# Patient Record
(demographics unavailable — no encounter records)

---

## 2024-10-17 NOTE — PHYSICAL EXAM
[] : no respiratory distress [Respiration, Rhythm And Depth] : normal respiratory rhythm and effort [Auscultation Breath Sounds / Voice Sounds] : lungs were clear to auscultation bilaterally [Apical Impulse] : the apical impulse was normal [Heart Rate And Rhythm] : heart rate was normal and rhythm regular [Heart Sounds] : normal S1 and S2 [Murmurs] : no murmurs [Clean] : clean [Dry] : dry [Healing Well] : healing well [No Edema] : no edema [FreeTextEntry3] : RT groin  [FreeTextEntry5] : LT G site

## 2024-10-17 NOTE — REASON FOR VISIT
[Family Member] : family member [de-identified] : re-do sternotomy and C3L (LIMA-LAD, SVG-OM, SVG-RCA) [de-identified] : 8/16/24

## 2024-10-17 NOTE — REASON FOR VISIT
[Family Member] : family member [de-identified] : re-do sternotomy and C3L (LIMA-LAD, SVG-OM, SVG-RCA) [de-identified] : 8/16/24

## 2024-10-17 NOTE — CONSULT LETTER
[Dear  ___] : Dear  [unfilled], [Courtesy Letter:] : I had the pleasure of seeing your patient, [unfilled], in my office today. [Please see my note below.] : Please see my note below. [Consult Closing:] : Thank you very much for allowing me to participate in the care of this patient.  If you have any questions, please do not hesitate to contact me. [FreeTextEntry2] : Dr. Mcguire [FreeTextEntry3] : Dr. Markel Arzate

## 2024-10-17 NOTE — PHYSICAL EXAM
[] : no respiratory distress [Respiration, Rhythm And Depth] : normal respiratory rhythm and effort [Auscultation Breath Sounds / Voice Sounds] : lungs were clear to auscultation bilaterally [Apical Impulse] : the apical impulse was normal [Heart Rate And Rhythm] : heart rate was normal and rhythm regular [Murmurs] : no murmurs [Heart Sounds] : normal S1 and S2 [Clean] : clean [Dry] : dry [Healing Well] : healing well [No Edema] : no edema [FreeTextEntry3] : RT groin  [FreeTextEntry5] : LT G site

## 2024-10-17 NOTE — ASSESSMENT
[FreeTextEntry1] : 80 y/o male with PMHx Mechanical Aortic valve replacement with Geoff Lester at [facility] 2004 for which he takes coumadin (last dose 8/9/24), SVT during a stress test (on metoprolol recently), Afib, DMT2 (pt follows Dr Lynn Nguyễn PMD), CKD-III (follows Renal Dr Kimani Christina) presents with SOB. Pt reports dyspnea on ambulation of 2-3 city blocks. Pt underwent NST which was Abnormal. Pt was referred for Cardiac cath by Dr Naren Mcguire. Pt s/p LHC demonstrating multivessel CAD and CT Surgery consulted for CABG evaluation.  8/16 s/p re-do sternotomy and C3L (LIMA-LAD, SVG-OM, SVG-RCA).  Post op Course: 2 PLTS/CRYO/ NSVT ARREST INOTROPES, Vascular consulted. LT SFA OCCLUSION  RT DISTAL TIBIAL ARTERY OCC, 8/18 VIFB ARREST / s/p defibrillated (required 1 shock with ACLS with ROSC after 1 round) subsequently reintubated. EP CONSULTED. Remains CHB/Junctional 50s with periods of AV conduction -- EP following, 8/20 Failed dysphagia eval -> s/p FEES passed.  8/23 s/p BiV ICD with EP.  Heart Failure Team following.  Started on Entresto 24/26 mg PO BID, INR 2.45 resumed home dose of coumadin 5 mg PO nightly. Acute Rehab facility bed available at [facility]. DC to  rehab. Follows Dr. Mcguire for cards/INR, follows Dr. Bailey for PCP  This visit he complaints of shortness of breath , His INR was 4.19 on 10/14/24. Denies any chest pain, palpitation, dizziness or pedal edema.   Dr. Arzate advised to take Warfarin 2.5 mg alternating with 5 mg very other day till he sees kaitlynn on 10/24/24.  Today on exam bilateral lung fields are clear, no wheezing or rales, no use of accessory muscles noted or respiratory distress, normal sinus rhythm, sternum stable, incision clean, dry and intact. RT groin and LT  SVG site is clean, dry and intact.  No peripheral edema noted.     Instructed patient on importance of optimal glycemic control, daily showering, daily weights, any signs of fever (temperature greater than 101F, chills,  incentive spirometer use, and increase ambulation as tolerated. Instructed to call office with any signs or symptoms of infection or weight gain of 2 or more pounds in 1 day or 3 or more pounds in 1 week.    Discussed intake of plant based foods, including vegetables, fruits, and whole grain foods: legumes, nuts and seeds, fish or seafood, lean meats, and non-fat or low-fat diary foods. Plant based oils (non-tropical) in place of solid fats. Instructed patient to limit intake of high fat meats and processed meats, high-fat diary foods, dietary cholesterol and sodium, foods and beverages with added sugars.   Plan: 1) Continue current medication regimen 2) Follow up with cardiologist ( Dr. Mcguire ) and PCP 3) Follow up in August 2025  4) Follow up with EP ( Dr. Sarkar)  5) Follow up INR with Dr. Mcguire and adjust the dose  6) Continue to increase activity and walk daily as tolerated. Continue to use incentive spirometer. 7) Keep legs elevated above heart when resting/sitting/sleeping. 8) Call MD if you experience fever, fatigue, dizziness, confusion, syncope, shortness of breath, chest pain not relieved with analgesics, increased redness/drainage from the surgical  incision site 9) Discontinue Entresto

## 2024-10-17 NOTE — ASSESSMENT
[FreeTextEntry1] : 80 y/o male with PMHx Mechanical Aortic valve replacement with Geoff Lester at [facility] 2004 for which he takes coumadin (last dose 8/9/24), SVT during a stress test (on metoprolol recently), Afib, DMT2 (pt follows Dr Lynn Nguyễn PMD), CKD-III (follows Renal Dr Kimani Christina) presents with SOB. Pt reports dyspnea on ambulation of 2-3 city blocks. Pt underwent NST which was Abnormal. Pt was referred for Cardiac cath by Dr Naren Mcguire. Pt s/p LHC demonstrating multivessel CAD and CT Surgery consulted for CABG evaluation.  8/16 s/p re-do sternotomy and C3L (LIMA-LAD, SVG-OM, SVG-RCA).  Post op Course: 2 PLTS/CRYO/ NSVT ARREST INOTROPES, Vascular consulted. LT SFA OCCLUSION  RT DISTAL TIBIAL ARTERY OCC, 8/18 VIFB ARREST / s/p defibrillated (required 1 shock with ACLS with ROSC after 1 round) subsequently reintubated. EP CONSULTED. Remains CHB/Junctional 50s with periods of AV conduction -- EP following, 8/20 Failed dysphagia eval -> s/p FEES passed.  8/23 s/p BiV ICD with EP.  Heart Failure Team following.  Started on Entresto 24/26 mg PO BID, INR 2.45 resumed home dose of coumadin 5 mg PO nightly. Acute Rehab facility bed available at [facility]. DC to  rehab. Follows Dr. Mcguire for cards/INR, follows Dr. Bailey for PCP  This visit he complaints of shortness of breath , His INR was 4.19 on 10/14/24. Denies any chest pain, palpitation, dizziness or pedal edema.   Dr. Arzate advised to take Warfarin 2.5 mg alternating with 5 mg very other day till he sees kaitlynn on 10/24/24.  Today on exam bilateral lung fields are clear, no wheezing or rales, no use of accessory muscles noted or respiratory distress, normal sinus rhythm, sternum stable, incision clean, dry and intact. RT groin and LT  SVG site is clean, dry and intact.  No peripheral edema noted.     Instructed patient on importance of optimal glycemic control, daily showering, daily weights, any signs of fever (temperature greater than 101F, chills,  incentive spirometer use, and increase ambulation as tolerated. Instructed to call office with any signs or symptoms of infection or weight gain of 2 or more pounds in 1 day or 3 or more pounds in 1 week.    Discussed intake of plant based foods, including vegetables, fruits, and whole grain foods: legumes, nuts and seeds, fish or seafood, lean meats, and non-fat or low-fat diary foods. Plant based oils (non-tropical) in place of solid fats. Instructed patient to limit intake of high fat meats and processed meats, high-fat diary foods, dietary cholesterol and sodium, foods and beverages with added sugars.   Plan: 1) Continue current medication regimen 2) Follow up with cardiologist ( Dr. Mcguire ) and PCP 3) Follow up in August 2025  4) Follow up with EP ( Dr. Sarkar)  5) Follow up INR with Dr. Mcguire and adjust the dose  6) Continue to increase activity and walk daily as tolerated. Continue to use incentive spirometer. 7) Keep legs elevated above heart when resting/sitting/sleeping. 8) Call MD if you experience fever, fatigue, dizziness, confusion, syncope, shortness of breath, chest pain not relieved with analgesics, increased redness/drainage from the surgical  incision site 9) Discontinue Entresto

## 2024-10-29 NOTE — HISTORY OF PRESENT ILLNESS
[FreeTextEntry1] : September 7, 2022: The patient feels well generally.  He has no new complaints.  He denies any chest pains, shortness of breath, or palpitations.  He does have nocturia.  He is hesitant about starting a new pill for because of the potential side effects.  He has noticed that his varicose veins are slightly more prominent. January 11, 2023: The patient wants to come in for his annual wellness visit.  He has no new complaints.  He did have an upper respiratory infection a week and a half ago and had a cough but it is getting better.  He took some over-the-counter cough medicines.  He denies any chest pains, shortness of breath, or palpitations. May 10, 2023: The patient sometimes gets pains in his neck.  He cannot sleep well on his right side and sleeps on his left side instead.  He denies any chest pains, shortness of breath, or palpitations. September 2023 - Patient returns today for follow-up for the first time since Gopi Mcguire MD retired. He is in his usual state of health. His INR today was 2.6.   May 2024 - Patient returns today for follow-up. He reports that he has been having dyspnea with minimal exertion. He reports feeling fine at rest, but with any exertion, he gets short of breath.  Walking is also limited by lower back and hip pain. Imaging performed elsewhere shows multilevel lumbar spondylosis.   9/25/2024 Arvind Cui returns today for follow up after a recent hospitalization. He presented for elective left heart catheterization on 8/13/24 following abnormal outpatient nuclear stress test and was found to have severe multivessel disease, TTE with newly reduced LVEF of 26%. On 8/16/24 he underwent 3 vessel CABG (LIMA - LAD, SVG - OM, SVG - RCA) with Dr. Arzate. Hospital course was complicated by VF arrest on 8/18 in the setting of underlying bigeminy and possible under-sensing PPM resulting in pacer induced R-on-T, ROSC achieved after 2.5 minutes of CPR and 1 shock. On 8/23/24 had upgrade of device to CRT-D. He was discharged to rehab and is now back home. As he returns today he is feeling fair overall. His biggest complaint is for pain at the device site which has not been responsive to Tylenol. Earlier today, at the EP office, he was advised to try topical lidocaine. For exercise he has been walking in the hallway and doing curls with a 5-pound weight.   INR of 6.54 notified by the office of Dr. Bailey on 9/23/2024. He was advised to reduce his usual 5 mg of warfarin to 2.5 mg for the next 2 days.

## 2024-10-29 NOTE — CARDIOLOGY SUMMARY
[de-identified] : 1/3/2024 - atrial fibrillation at 89 bpm, RBBB [de-identified] : 7/24/2024. Nuclear stress test.  1. Myocardial Perfusion: Abnormal. 2. Perfusion Findings: Severe, fixed defects in the basal to mid inferior wall is consistent with a medium sized area of infarction. The distal inferior wall and apex has reversible defects consistent with ischemia. 3. Hypokinesis of the basal to mid inferior wall is seen. 4. The post stress left ventricular EF is 44 %. The stress end diastolic volume is 103 ml and systolic volume is 57 ml. [de-identified] : 10/5/2022 - mechanical aortic valve replacement, normal pulmonary pressures, dilated LA, mild concentric LVH, LVEF 52% 1/3/2024 - mechanical aortic valve replacement, normal pulmonary pressures, dilated LA, mild concentric LVH, LVEF 50%  8/18/2024. TTE.  1. Left ventricular systolic function is moderately to severely decreased with an ejection fraction of 35 % by Noble's method of disks.  2. Mildly enlarged right ventricular cavity size and mild to moderately reduced right ventricular systolic function.  3. The right atrium is mildly dilated.  4. There is a mechanical aortic prosthesis present with doppler parameters within what are considered to be normal limits.  5. Compared to the transthoracic echocardiogram performed on 8/13/2024, right ventricular systolic function appears mildly worsened. Left ventricular systolic function remains diminished (by visual inspection, LVEF has not increased--caution is advised in serial interpretation of reported EF on this and the prior study. Biplane EF was calculated by the reader on this study).  [de-identified] : 8/13/2024- [facility] with Chadwick Farmer MD at [facility].  Left main artery: The segment is visually normal in size and structure.  Proximal left anterior descending: There is a 60 % calcified stenosis. Mid left anterior descending: There is a 70 % calcified stenosis in the proximal third portion of the segment. Mid left anterior descending: There is a 55 % stenosis.  First diagonal: There is a 40 % stenosis. Second diagonal: There is a 20 % stenosis.   Proximal circumflex: There is a 40 % stenosis in the ostium portion of the segment. Mid circumflex: There is a 70 % stenosis. First obtuse marginal: There is a 99 % stenosis. Distal circumflex: There is a 45 % stenosis. Proximal right coronary artery: There is a 99 % calcified stenosis. Mid right coronary artery: There is an 85 % stenosis. Right posterior descending artery: There is a 35 % stenosis. Right posterior descending artery: There is a 60 % stenosis in the ostium portion of the segment.   [de-identified] : 2004 - mechanical aortic valve replacement with Geoff Lester MD at [facility]

## 2024-10-29 NOTE — PHYSICAL EXAM
[General Appearance - Well Developed] : well developed [Normal Appearance] : normal appearance [Well Groomed] : well groomed [General Appearance - Well Nourished] : well nourished [No Deformities] : no deformities [General Appearance - In No Acute Distress] : no acute distress [Normal Conjunctiva] : the conjunctiva exhibited no abnormalities [Eyelids - No Xanthelasma] : the eyelids demonstrated no xanthelasmas [Normal Oral Mucosa] : normal oral mucosa [No Oral Pallor] : no oral pallor [No Oral Cyanosis] : no oral cyanosis [Normal Jugular Venous A Waves Present] : normal jugular venous A waves present [Normal Jugular Venous V Waves Present] : normal jugular venous V waves present [No Jugular Venous Arias A Waves] : no jugular venous arias A waves [Respiration, Rhythm And Depth] : normal respiratory rhythm and effort [Exaggerated Use Of Accessory Muscles For Inspiration] : no accessory muscle use [Auscultation Breath Sounds / Voice Sounds] : lungs were clear to auscultation bilaterally [Abdomen Soft] : soft [Abdomen Tenderness] : non-tender [Abdomen Mass (___ Cm)] : no abdominal mass palpated [Abnormal Walk] : normal gait [Gait - Sufficient For Exercise Testing] : the gait was sufficient for exercise testing [Nail Clubbing] : no clubbing of the fingernails [Cyanosis, Localized] : no localized cyanosis [Petechial Hemorrhages (___cm)] : no petechial hemorrhages [Skin Color & Pigmentation] : normal skin color and pigmentation [] : no rash [No Venous Stasis] : no venous stasis [Skin Lesions] : no skin lesions [No Skin Ulcers] : no skin ulcer [No Xanthoma] : no  xanthoma was observed [Oriented To Time, Place, And Person] : oriented to person, place, and time [Affect] : the affect was normal [Mood] : the mood was normal [No Anxiety] : not feeling anxious [FreeTextEntry1] : vaicose veins

## 2024-10-29 NOTE — REVIEW OF SYSTEMS
[Blurry Vision] : blurred vision [Negative] : Heme/Lymph [SOB] : no shortness of breath [Dyspnea on exertion] : not dyspnea during exertion [Chest Discomfort] : no chest discomfort [Lower Ext Edema] : no extremity edema [Leg Claudication] : no intermittent leg claudication [Palpitations] : no palpitations [Orthopnea] : no orthopnea [PND] : no PND [Syncope] : no syncope [Dizziness] : no dizziness

## 2024-10-29 NOTE — CARDIOLOGY SUMMARY
[de-identified] : 1/3/2024 - atrial fibrillation at 89 bpm, RBBB [de-identified] : 7/24/2024. Nuclear stress test.  1. Myocardial Perfusion: Abnormal. 2. Perfusion Findings: Severe, fixed defects in the basal to mid inferior wall is consistent with a medium sized area of infarction. The distal inferior wall and apex has reversible defects consistent with ischemia. 3. Hypokinesis of the basal to mid inferior wall is seen. 4. The post stress left ventricular EF is 44 %. The stress end diastolic volume is 103 ml and systolic volume is 57 ml. [de-identified] : 10/5/2022 - mechanical aortic valve replacement, normal pulmonary pressures, dilated LA, mild concentric LVH, LVEF 52% 1/3/2024 - mechanical aortic valve replacement, normal pulmonary pressures, dilated LA, mild concentric LVH, LVEF 50%  8/18/2024. TTE.  1. Left ventricular systolic function is moderately to severely decreased with an ejection fraction of 35 % by Noble's method of disks.  2. Mildly enlarged right ventricular cavity size and mild to moderately reduced right ventricular systolic function.  3. The right atrium is mildly dilated.  4. There is a mechanical aortic prosthesis present with doppler parameters within what are considered to be normal limits.  5. Compared to the transthoracic echocardiogram performed on 8/13/2024, right ventricular systolic function appears mildly worsened. Left ventricular systolic function remains diminished (by visual inspection, LVEF has not increased--caution is advised in serial interpretation of reported EF on this and the prior study. Biplane EF was calculated by the reader on this study).  [de-identified] : 8/13/2024- [facility] with Chadwick Farmer MD at [facility].  Left main artery: The segment is visually normal in size and structure.  Proximal left anterior descending: There is a 60 % calcified stenosis. Mid left anterior descending: There is a 70 % calcified stenosis in the proximal third portion of the segment. Mid left anterior descending: There is a 55 % stenosis.  First diagonal: There is a 40 % stenosis. Second diagonal: There is a 20 % stenosis.   Proximal circumflex: There is a 40 % stenosis in the ostium portion of the segment. Mid circumflex: There is a 70 % stenosis. First obtuse marginal: There is a 99 % stenosis. Distal circumflex: There is a 45 % stenosis. Proximal right coronary artery: There is a 99 % calcified stenosis. Mid right coronary artery: There is an 85 % stenosis. Right posterior descending artery: There is a 35 % stenosis. Right posterior descending artery: There is a 60 % stenosis in the ostium portion of the segment.   [de-identified] : 2004 - mechanical aortic valve replacement with Geoff Lester MD at [facility]

## 2024-10-29 NOTE — DISCUSSION/SUMMARY
[EKG obtained to assist in diagnosis and management of assessed problem(s)] : EKG obtained to assist in diagnosis and management of assessed problem(s) [FreeTextEntry1] : The patient was examined. His blood pressure was 113/73 mmHg, and his pulse was 84 bpm. His lungs were clear to auscultation. Cardiac exam revealed sharp prosthetic valve sounds with a 2/6 systolic ejection murmur in the aortic area.   We will continue to manage anticoagulation in this medically complicated patient with a mechanical heart valve. POC INR today 3.0.  Now off Entresto.  Continue metoprolol and furosemide for patient with ICM and HFrEF, LVEF 35%. Would plan to escalate GDMT as tolerated but may be limited by his baseline low blood pressures.  Continue aspirin and high intensity statin therapy.   We have discussed cardiac rehabilitation in detail, and he will consider it.  Follow up with EP as scheduled. Follow up with Heart Failure Team as scheduled.  Follow up here in 3 months.

## 2024-10-29 NOTE — END OF VISIT
[Time Spent: ___ minutes] : I have spent [unfilled] minutes of time on the encounter which excludes teaching and separately reported services. [FreeTextEntry3] : I, Naren Mcguire MD, personally performed the evaluation and management (E/M) services for this established patient who presents today with (a) new problem(s)/exacerbation of (an) existing condition(s). That E/M includes conducting the clinically appropriate interval history &/or exam, assessing all new/exacerbated conditions, and establishing a new plan of care. Today, Sheyla Elaine NP was here to observe my evaluation and management service for this new problem/exacerbated condition and follow the plan of care established by me going forward.

## 2024-10-29 NOTE — REASON FOR VISIT
[Structural Heart and Valve Disease] : structural heart and valve disease [Other: _____] : [unfilled] [FreeTextEntry3] : Gopi Bailey MD [FreeTextEntry1] : 10/24/2024 Arvind Cui returns today for scheduled follow up. He has been feeling great and continues to exercise towards a goal of rebuilding his strength following his recent hospitalization. He was seen by Dr. Arzate who discontinued low dose Entresto and since that time his blood pressure has returned to near normal. His INR goal is 2-3 (current warfarin dosing 5 mg and 2.5 mg alternating every other day). He describes no chest discomfort, shortness of breath, palpitations, lightheadedness or syncope. He continues to take his medications as directed.

## 2024-10-29 NOTE — CARDIOLOGY SUMMARY
[de-identified] : 1/3/2024 - atrial fibrillation at 89 bpm, RBBB [de-identified] : 7/24/2024. Nuclear stress test.  1. Myocardial Perfusion: Abnormal. 2. Perfusion Findings: Severe, fixed defects in the basal to mid inferior wall is consistent with a medium sized area of infarction. The distal inferior wall and apex has reversible defects consistent with ischemia. 3. Hypokinesis of the basal to mid inferior wall is seen. 4. The post stress left ventricular EF is 44 %. The stress end diastolic volume is 103 ml and systolic volume is 57 ml. [de-identified] : 10/5/2022 - mechanical aortic valve replacement, normal pulmonary pressures, dilated LA, mild concentric LVH, LVEF 52% 1/3/2024 - mechanical aortic valve replacement, normal pulmonary pressures, dilated LA, mild concentric LVH, LVEF 50%  8/18/2024. TTE.  1. Left ventricular systolic function is moderately to severely decreased with an ejection fraction of 35 % by Noble's method of disks.  2. Mildly enlarged right ventricular cavity size and mild to moderately reduced right ventricular systolic function.  3. The right atrium is mildly dilated.  4. There is a mechanical aortic prosthesis present with doppler parameters within what are considered to be normal limits.  5. Compared to the transthoracic echocardiogram performed on 8/13/2024, right ventricular systolic function appears mildly worsened. Left ventricular systolic function remains diminished (by visual inspection, LVEF has not increased--caution is advised in serial interpretation of reported EF on this and the prior study. Biplane EF was calculated by the reader on this study).  [de-identified] : 8/13/2024- [facility] with Chadwick Farmer MD at [facility].  Left main artery: The segment is visually normal in size and structure.  Proximal left anterior descending: There is a 60 % calcified stenosis. Mid left anterior descending: There is a 70 % calcified stenosis in the proximal third portion of the segment. Mid left anterior descending: There is a 55 % stenosis.  First diagonal: There is a 40 % stenosis. Second diagonal: There is a 20 % stenosis.   Proximal circumflex: There is a 40 % stenosis in the ostium portion of the segment. Mid circumflex: There is a 70 % stenosis. First obtuse marginal: There is a 99 % stenosis. Distal circumflex: There is a 45 % stenosis. Proximal right coronary artery: There is a 99 % calcified stenosis. Mid right coronary artery: There is an 85 % stenosis. Right posterior descending artery: There is a 35 % stenosis. Right posterior descending artery: There is a 60 % stenosis in the ostium portion of the segment.   [de-identified] : 2004 - mechanical aortic valve replacement with Geoff Lester MD at [facility]

## 2024-11-08 NOTE — ASSESSMENT
[FreeTextEntry1] : Mr. Cui is a 79 yr old M w/ h/o ICM/HFrEF (EF 35%, LVIDd 5.3cm) s/p CRT-D (8/23/24) for post-op CHB and PMT/VT arrest, CAD s/p 3vCABG 8/16/24 (LIMA-LAD, SVG-OM, SVG-RCA), AF (on AC), mechanical AVR (2004 with Dr. Lester on Coumadin), DM2 (A1C 7.7%), CKD (baseline 1.8-2) here for f/u.Currently ACC/AHA Stage II-III symptoms, euvolemic, normotensive  #HFrEF -Etiology: ICM -GDMT: currently on Toprol XL 25mg QD; increase to 25 mg twice/day - prev on Entresto 24/26mg BID; d/c'ed due to low BP; may benefit from losartan but will repeat labs in 2 weeks - c/w Jardiance 10 mg  - c/w spironolactone 25 mg daily; counseled on low potassium foods -Diuretics: change furosemide 20mg prn (was taking M/F); goal weight 220 pounds -Device: CRT-D -Recommended for cardiac rehab, however patient would like to think about it - repeat BMP in 2 weeks  #CAD -3vCABG (LIMA-LAD, SVG-OM, SVG-RCA) -on ASA, statin, and GDMT as above -f/u with Dr. Mcguire  #Aortic Valve Repair -mechanical AVR (2004 with Dr. Lester) -on coumadin  #CKD - b/l Cr 1.5-2 -f/u with Dr. aHrman  # DM2 - on tradjenta 5mg QD and Jardiance - A1C 7.7% - f/u with Dr. Bailey  RTC 3 months with PA, 6 months with me

## 2024-11-08 NOTE — PHYSICAL EXAM
[Normal] : normal conjunctiva [Normal S1, S2] : normal S1, S2 [Clear Lung Fields] : clear lung fields [Soft] : abdomen soft [Non Tender] : non-tender [Normal Gait] : normal gait [No Edema] : no edema [No Rash] : no rash [Moves all extremities] : moves all extremities [Alert and Oriented] : alert and oriented [de-identified] : JVP 6-8 cm H20 [de-identified] : mechanical S2

## 2024-11-08 NOTE — CARDIOLOGY SUMMARY
[de-identified] : 11/8/24 - possibly sinus; BiV pacing [de-identified] :  NST 7/25/24: Myocardial Perfusion: Abnormal. Perfusion Findings: Severe, fixed defects in the basal to mid inferior wall is consistent with a medium sized area of infarction. The distal inferior wall and apex has reversible defects consistent with ischemia.Hypokinesis of the basal to mid inferior wall is seen. The post stress left ventricular EF is 44 %. The stress end diastolic volume is 103 ml and systolic volume is 57 ml. [de-identified] :  -TTE 8/18: LVIDd 5.3cm, EF 35%, enlarged RV with reduced function. Nml functioning mechanical AV - TTE 8/13: 8/13:Left ventricular cavity is normal in size. Left ventricular wall thickness is normal. Left ventricular systolic function is severely decreased with an ejection fraction of 26 % by Noble's method of disks. Global left ventricular hypokinesis. There is severe (grade 3) left ventricular diastolic dysfunction, with elevated left ventricular filling pressure. The right ventricle is not well visualized. Borderline reduced right ventricular systolic function. No LV thrombus. Nml LA/RA volume. RA pressures 0-5. No paravalvular AR or AS.   -TTE 1/3/24: A mechanical valve replacement is present in the aortic position. Trace intravalvular regurgitation. Septal motion is abnormal consistent with previous cardiac surgery. Left ventricular systolic function is mildly decreased with an ejection fraction of 50 % by Noble's method of disks. Compared to the transthoracic echocardiogram performed on 10/5/2022 there have been no significant interval changes.  [de-identified] : - [facility]: 8/13/24: Multivessel dx. pLAD 60%, mLAD 70%, D1 40%. oLcx 40%, OM1 99%. pRCA 99%, mRCA 85%, oRPDA 60%

## 2024-11-08 NOTE — HISTORY OF PRESENT ILLNESS
[FreeTextEntry1] : Mr. Cui is a 79 yr old M w/ h/o ICM/HFrEF (EF 35%, LVIDd 5.3cm) s/p CRT-D (8/23/24) for post-op CHB and PMT/VT arrest, CAD s/p 3vCABG 8/16/24 (LIMA-LAD, SVG-OM, SVG-RCA), AF (on AC), mechanical AVR (2004 with Dr. Lestre on Coumadin), DM2 (A1C 7.7%), CKD (baseline 1.8-2) here for f/u.  He presented for elective LHC on 8/13/24 following abnormal outpatient NST. Found to have severe multivessel disease, TTE with newly reduced LVEF of 26%. On 8/16, underwent 3v CABG (LIMA - LAD, SVG - OM, SVG - RCA) with Dr. Arzate. Hospital course was complicated by VF arrest on 8/18 in the setting of underlying bigeminy and possible under-sensing PPM resulting in pacer induced R-on-T, ROSC achieved after 2.5 minutes of CPR and 1 shock. On 8/23 had upgrade of device to CRT-D. He went to [facility] on 8/26/24 to 9/11/24 and was d/c with Toprol XL 25mg QD and lasix 20mg QD. Discharge weight from hospital was 234.3lb.   He is accompanied by his brother. Has been feeling well. Hasn't been to ER since discharge. Had noted BP in 80-90s when saw Dr. Arzate in October 17. Since BP in 120s. Lives alone but brother checks in on him. Weight 220 pounds. Takes lasix Monday/Friday with good effect.   He is adherent to low sodium diet. Denies dizziness, LH, Syncope  Walks 1/2 block limited by thigh fatigue. Doesn't go up the stairs. Cardiac rehab was discussed in the past and recommended but he would like to defer.   Brother manages meds. Has been sleeping on 3 pillows due to prior sensation of orthopnea in 2004 but doesn't have this now. Denies bendopnea.

## 2024-11-08 NOTE — REVIEW OF SYSTEMS
[FreeTextEntry1] : 14 point ROS done and found to be negative or noncontributory other than noted in HPI

## 2024-12-06 NOTE — ASSESSMENT
[FreeTextEntry1] : Goal A1c LT    8% Limit carbohydrates in diet Exercise 5-6 days per week Advocated dilated retinal exam yearly Proper foot care reviewed

## 2024-12-06 NOTE — HISTORY OF PRESENT ILLNESS
[de-identified] : Mr. LEVI comes in for follow-up of diabetes mellitus.  His  FS at home in range of . There have been  no hypoglycemic reactions.  He  denies foot lesions and dysthesias. There has been no chest pain, shortness of breath or edema.  Denies visual problems,  nocturia and  weight change.  He states adherence to medication regimen.

## 2024-12-06 NOTE — HISTORY OF PRESENT ILLNESS
[de-identified] : Mr. LEVI comes in for follow-up of diabetes mellitus.  His  FS at home in range of . There have been  no hypoglycemic reactions.  He  denies foot lesions and dysthesias. There has been no chest pain, shortness of breath or edema.  Denies visual problems,  nocturia and  weight change.  He states adherence to medication regimen.

## 2024-12-16 NOTE — PHYSICAL EXAM
[General Appearance - In No Acute Distress] : in no acute distress [General Appearance - Alert] : alert [General Appearance - Well Nourished] : well nourished [General Appearance - Well Developed] : well developed [Sclera] : the sclera and conjunctiva were normal [Jugular Venous Distention Increased] : there was no jugular-venous distention [Respiration, Rhythm And Depth] : normal respiratory rhythm and effort [] : no respiratory distress [Exaggerated Use Of Accessory Muscles For Inspiration] : no accessory muscle use [Auscultation Breath Sounds / Voice Sounds] : lungs were clear to auscultation bilaterally [Heart Sounds Gallop] : no gallops [Murmurs] : no murmurs [Heart Sounds Pericardial Friction Rub] : no pericardial rub [FreeTextEntry1] : mechanical heart sound noted [Edema] : there was no peripheral edema [No CVA Tenderness] : no ~M costovertebral angle tenderness [No Spinal Tenderness] : no spinal tenderness [Oriented To Time, Place, And Person] : oriented to person, place, and time [Impaired Insight] : insight and judgment were intact [Affect] : the affect was normal [Mood] : the mood was normal

## 2024-12-16 NOTE — HISTORY OF PRESENT ILLNESS
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  He has been retired for the last 13 years or so and [place] [facility] [place]  His medical history is mostly unremarkable with the exception of an aortic valve replacement in 2004 for which he takes coumadin.  He developed SVT during a stress test and so he is on metoprolol recently.  On my evaluation today the patient is asymptomatic.  He wakes up once per night to urinate but denies hesitancy and endorses a healthy stream.  He has no hematuria, dysuria.  He has no dyspnea, nausea, vomiting, diarrhea, chest pain, fevers, nor rashes.  He has no family history of kidney problems.    7/2/18 Since our last visit Arvind has felt great and denies all complaints.  He saw urology and his cyst is not concerning.    10/29/18 Since our last visit Arvind continues to feel well.  He has no complaints no dyspnea no urinary issues no nausea vomiting or diarrhea he has stopped all of his supplements about two weeks ago. Overall he feels quite well.  4/29/19 -- Since I saw Arvind last he continues to feel well.  He has no symptoms.  no cp no sob no nvd.  12/2/19 -- Since I last saw Arvind he has no complaints.  He recently had an episode of nephrolithiasis requiring ureteral stent which has since been removed.  He drinks plenty of water and has a low salt diet.  On evaluation today he has no chest pain no shortness of breath.  9/18/20 -- Since I last saw Arvind he has had some bilateral lower back pain that he is using capsaicin for.  Still wakes up at night two or three times to urinate.  Otherwise feels well.  4/5/21 -- Arvind feels well no complaints.  Has been staying in isolation during COVID  10/19/21 -- Arvind feels well and denies complaints.  He has no chest pain no shortness of breath.  Still has not had his COVID vaccine.  4/4/22 -- Feels well denies complaints.  Has had his covid vaccine.    10/20/22 -- Feels well denies complaints recent dental surgery and cataract surgey.  No new medications.  no complaints.  5/8/23 -- Feels well denies complaints no changes to medications recently.    6/17/24 -- Feels completely fine.  no symptoms but was recently diagnosed with diabetes and has been taking insulin.  12/16/24 -- Feels well today.  Since our last visit he had CABG for triple vessel disease.  course was complicated by cardiac arrest heart failure.  Doing better now was in rehab.  entresto was held due to hypotension.

## 2024-12-16 NOTE — ASSESSMENT
[FreeTextEntry1] : Arvind is a 79  years old man with a history of CKD III here for follow up.  Chronic Kidney Disease Stage III -- Stage of CKD is based upon eGFR(cr), eGFR (cys), and 24 urine collection which seem to place his GFR at about 45 or so and has not changed much even in light of recent events for CABG. 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  Renal sonogram showing some areas of cortical thinning.  Cr in 2014 was 1.2, in 2016 about 1.4 and now steady at about 1.7 to 1.8 more recently 1.77 on 12/13.  I explained to him the nature of the heart kidney connection.  I explained the concept of permissive hypercreatinemia while on GDMT for CAD and CHF.  Entresto being held presently for hypotension.  Will need to monitor cr and k closely if restarts.    Blood Pressure -- was low but improved after stopping entresto  Hyperkalemia -- last K was 4.6 on 12/13 doing well with low K diet.  If needs to go back on entresto will need to continue alternative.   rto 6 months.

## 2024-12-16 NOTE — REVIEW OF SYSTEMS
[Fever] : no fever [Chills] : no chills [Feeling Poorly] : not feeling poorly [Feeling Tired] : not feeling tired [Eyesight Problems] : no eyesight problems [Nosebleeds] : no nosebleeds [Chest Pain] : no chest pain [Leg Claudication] : no intermittent leg claudication [Lower Ext Edema] : no extremity edema [Shortness Of Breath] : no shortness of breath [Wheezing] : no wheezing [As Noted in HPI] : as noted in HPI [Dizziness] : no dizziness [Fainting] : no fainting

## 2025-01-23 NOTE — PHYSICAL EXAM
[General Appearance - Well Developed] : well developed [Normal Appearance] : normal appearance [Well Groomed] : well groomed [General Appearance - Well Nourished] : well nourished [No Deformities] : no deformities [General Appearance - In No Acute Distress] : no acute distress [Normal Conjunctiva] : the conjunctiva exhibited no abnormalities [Eyelids - No Xanthelasma] : the eyelids demonstrated no xanthelasmas [Normal Oral Mucosa] : normal oral mucosa [No Oral Pallor] : no oral pallor [No Oral Cyanosis] : no oral cyanosis [Normal Jugular Venous A Waves Present] : normal jugular venous A waves present [No Jugular Venous Arias A Waves] : no jugular venous arias A waves [Normal Jugular Venous V Waves Present] : normal jugular venous V waves present [Respiration, Rhythm And Depth] : normal respiratory rhythm and effort [Exaggerated Use Of Accessory Muscles For Inspiration] : no accessory muscle use [Auscultation Breath Sounds / Voice Sounds] : lungs were clear to auscultation bilaterally [Abdomen Soft] : soft [Abdomen Tenderness] : non-tender [Abdomen Mass (___ Cm)] : no abdominal mass palpated [Abnormal Walk] : normal gait [Gait - Sufficient For Exercise Testing] : the gait was sufficient for exercise testing [Nail Clubbing] : no clubbing of the fingernails [Cyanosis, Localized] : no localized cyanosis [Petechial Hemorrhages (___cm)] : no petechial hemorrhages [Skin Color & Pigmentation] : normal skin color and pigmentation [] : no rash [No Venous Stasis] : no venous stasis [Skin Lesions] : no skin lesions [No Skin Ulcers] : no skin ulcer [No Xanthoma] : no  xanthoma was observed [Oriented To Time, Place, And Person] : oriented to person, place, and time [Affect] : the affect was normal [Mood] : the mood was normal [No Anxiety] : not feeling anxious [FreeTextEntry1] : vaicose veins

## 2025-01-23 NOTE — CARDIOLOGY SUMMARY
[de-identified] : 1/3/2024 - atrial fibrillation at 89 bpm, RBBB [de-identified] : 7/24/2024. Nuclear stress test.  1. Myocardial Perfusion: Abnormal. 2. Perfusion Findings: Severe, fixed defects in the basal to mid inferior wall is consistent with a medium sized area of infarction. The distal inferior wall and apex has reversible defects consistent with ischemia. 3. Hypokinesis of the basal to mid inferior wall is seen. 4. The post stress left ventricular EF is 44 %. The stress end diastolic volume is 103 ml and systolic volume is 57 ml. [de-identified] : 8/13/2024- [facility] with Chadwick Farmer MD at [facility].  Left main artery: The segment is visually normal in size and structure.  Proximal left anterior descending: There is a 60 % calcified stenosis. Mid left anterior descending: There is a 70 % calcified stenosis in the proximal third portion of the segment. Mid left anterior descending: There is a 55 % stenosis.  First diagonal: There is a 40 % stenosis. Second diagonal: There is a 20 % stenosis.   Proximal circumflex: There is a 40 % stenosis in the ostium portion of the segment. Mid circumflex: There is a 70 % stenosis. First obtuse marginal: There is a 99 % stenosis. Distal circumflex: There is a 45 % stenosis. Proximal right coronary artery: There is a 99 % calcified stenosis. Mid right coronary artery: There is an 85 % stenosis. Right posterior descending artery: There is a 35 % stenosis. Right posterior descending artery: There is a 60 % stenosis in the ostium portion of the segment.   [de-identified] : 10/5/2022 - mechanical aortic valve replacement, normal pulmonary pressures, dilated LA, mild concentric LVH, LVEF 52% 1/3/2024 - mechanical aortic valve replacement, normal pulmonary pressures, dilated LA, mild concentric LVH, LVEF 50%  8/18/2024. TTE.  1. Left ventricular systolic function is moderately to severely decreased with an ejection fraction of 35 % by Noble's method of disks.  2. Mildly enlarged right ventricular cavity size and mild to moderately reduced right ventricular systolic function.  3. The right atrium is mildly dilated.  4. There is a mechanical aortic prosthesis present with doppler parameters within what are considered to be normal limits.  5. Compared to the transthoracic echocardiogram performed on 8/13/2024, right ventricular systolic function appears mildly worsened. Left ventricular systolic function remains diminished (by visual inspection, LVEF has not increased--caution is advised in serial interpretation of reported EF on this and the prior study. Biplane EF was calculated by the reader on this study).  [de-identified] : 2004 - mechanical aortic valve replacement with Geoff Lester MD at [facility]

## 2025-01-23 NOTE — DISCUSSION/SUMMARY
[EKG obtained to assist in diagnosis and management of assessed problem(s)] : EKG obtained to assist in diagnosis and management of assessed problem(s) [FreeTextEntry1] : The patient was examined. His blood pressure was 122/70 mmHg, and his pulse was 74 bpm. His lungs were clear to auscultation. Cardiac exam revealed sharp prosthetic valve sounds with a 2/6 systolic ejection murmur in the aortic area.   We will continue to manage anticoagulation in this medically complicated patient with a mechanical heart valve. POC INR today 3.0.  Now back on Entresto, but at low dose 24-26 mg BID. Continue metoprolol and furosemide for patient with ICM and HFrEF, LVEF 35%. Increase metoprolol from 25 mg BID to 50 mg BID.  Would plan to escalate GDMT as tolerated but may be limited by his baseline low blood pressures.  Continue aspirin and high intensity statin therapy.   We have discussed cardiac rehabilitation in detail, and he will consider it.  Follow up with EP as scheduled. Follow up with Heart Failure Team as scheduled.  Follow up here in 3 months.

## 2025-01-23 NOTE — REASON FOR VISIT
[Structural Heart and Valve Disease] : structural heart and valve disease [Other: _____] : [unfilled] [FreeTextEntry3] : Gopi Bailey MD [FreeTextEntry1] : January 2025 - Patient returns today for follow-up in his usual state of health.  INR in the office today is 2.1

## 2025-01-23 NOTE — HISTORY OF PRESENT ILLNESS
[FreeTextEntry1] : September 7, 2022: The patient feels well generally.  He has no new complaints.  He denies any chest pains, shortness of breath, or palpitations.  He does have nocturia.  He is hesitant about starting a new pill for because of the potential side effects.  He has noticed that his varicose veins are slightly more prominent. January 11, 2023: The patient wants to come in for his annual wellness visit.  He has no new complaints.  He did have an upper respiratory infection a week and a half ago and had a cough but it is getting better.  He took some over-the-counter cough medicines.  He denies any chest pains, shortness of breath, or palpitations. May 10, 2023: The patient sometimes gets pains in his neck.  He cannot sleep well on his right side and sleeps on his left side instead.  He denies any chest pains, shortness of breath, or palpitations. September 2023 - Patient returns today for follow-up for the first time since Gopi Mcguire MD retired. He is in his usual state of health. His INR today was 2.6.   May 2024 - Patient returns today for follow-up. He reports that he has been having dyspnea with minimal exertion. He reports feeling fine at rest, but with any exertion, he gets short of breath.  Walking is also limited by lower back and hip pain. Imaging performed elsewhere shows multilevel lumbar spondylosis.   9/25/2024 Arvind Cui returns today for follow up after a recent hospitalization. He presented for elective left heart catheterization on 8/13/24 following abnormal outpatient nuclear stress test and was found to have severe multivessel disease, TTE with newly reduced LVEF of 26%. On 8/16/24 he underwent 3 vessel CABG (LIMA - LAD, SVG - OM, SVG - RCA) with Dr. Arzate. Hospital course was complicated by VF arrest on 8/18 in the setting of underlying bigeminy and possible under-sensing PPM resulting in pacer induced R-on-T, ROSC achieved after 2.5 minutes of CPR and 1 shock. On 8/23/24 had upgrade of device to CRT-D. He was discharged to rehab and is now back home. As he returns today he is feeling fair overall. His biggest complaint is for pain at the device site which has not been responsive to Tylenol. Earlier today, at the EP office, he was advised to try topical lidocaine. For exercise he has been walking in the hallway and doing curls with a 5-pound weight.   INR of 6.54 notified by the office of Dr. Bailey on 9/23/2024. He was advised to reduce his usual 5 mg of warfarin to 2.5 mg for the next 2 days.   10/24/2024 Arvind Cui returns today for scheduled follow up. He has been feeling great and continues to exercise towards a goal of rebuilding his strength following his recent hospitalization. He was seen by Dr. Arzate who discontinued low dose Entresto and since that time his blood pressure has returned to near normal. His INR goal is 2-3 (current warfarin dosing 5 mg and 2.5 mg alternating every other day). He describes no chest discomfort, shortness of breath, palpitations, lightheadedness or syncope. He continues to take his medications as directed.

## 2025-02-13 NOTE — CARDIOLOGY SUMMARY
[de-identified] : 2/13/25 Sinus Rhythm, 63bpm, BIV Paced 11/8/24 - possibly sinus; BiV pacing [de-identified] :  NST 7/25/24: Myocardial Perfusion: Abnormal. Perfusion Findings: Severe, fixed defects in the basal to mid inferior wall is consistent with a medium sized area of infarction. The distal inferior wall and apex has reversible defects consistent with ischemia.Hypokinesis of the basal to mid inferior wall is seen. The post stress left ventricular EF is 44 %. The stress end diastolic volume is 103 ml and systolic volume is 57 ml. [de-identified] :  -TTE 8/18: LVIDd 5.3cm, EF 35%, enlarged RV with reduced function. Nml functioning mechanical AV - TTE 8/13: 8/13:Left ventricular cavity is normal in size. Left ventricular wall thickness is normal. Left ventricular systolic function is severely decreased with an ejection fraction of 26 % by Noble's method of disks. Global left ventricular hypokinesis. There is severe (grade 3) left ventricular diastolic dysfunction, with elevated left ventricular filling pressure. The right ventricle is not well visualized. Borderline reduced right ventricular systolic function. No LV thrombus. Nml LA/RA volume. RA pressures 0-5. No paravalvular AR or AS.   -TTE 1/3/24: A mechanical valve replacement is present in the aortic position. Trace intravalvular regurgitation. Septal motion is abnormal consistent with previous cardiac surgery. Left ventricular systolic function is mildly decreased with an ejection fraction of 50 % by Noble's method of disks. Compared to the transthoracic echocardiogram performed on 10/5/2022 there have been no significant interval changes.  [de-identified] : - [facility]: 8/13/24: Multivessel dx. pLAD 60%, mLAD 70%, D1 40%. oLcx 40%, OM1 99%. pRCA 99%, mRCA 85%, oRPDA 60%

## 2025-02-13 NOTE — CARDIOLOGY SUMMARY
[de-identified] : 2/13/25 Sinus Rhythm, 63bpm, BIV Paced 11/8/24 - possibly sinus; BiV pacing [de-identified] :  NST 7/25/24: Myocardial Perfusion: Abnormal. Perfusion Findings: Severe, fixed defects in the basal to mid inferior wall is consistent with a medium sized area of infarction. The distal inferior wall and apex has reversible defects consistent with ischemia.Hypokinesis of the basal to mid inferior wall is seen. The post stress left ventricular EF is 44 %. The stress end diastolic volume is 103 ml and systolic volume is 57 ml. [de-identified] : - [facility]: 8/13/24: Multivessel dx. pLAD 60%, mLAD 70%, D1 40%. oLcx 40%, OM1 99%. pRCA 99%, mRCA 85%, oRPDA 60%  [de-identified] :  -TTE 8/18: LVIDd 5.3cm, EF 35%, enlarged RV with reduced function. Nml functioning mechanical AV - TTE 8/13: 8/13:Left ventricular cavity is normal in size. Left ventricular wall thickness is normal. Left ventricular systolic function is severely decreased with an ejection fraction of 26 % by Noble's method of disks. Global left ventricular hypokinesis. There is severe (grade 3) left ventricular diastolic dysfunction, with elevated left ventricular filling pressure. The right ventricle is not well visualized. Borderline reduced right ventricular systolic function. No LV thrombus. Nml LA/RA volume. RA pressures 0-5. No paravalvular AR or AS.   -TTE 1/3/24: A mechanical valve replacement is present in the aortic position. Trace intravalvular regurgitation. Septal motion is abnormal consistent with previous cardiac surgery. Left ventricular systolic function is mildly decreased with an ejection fraction of 50 % by Noble's method of disks. Compared to the transthoracic echocardiogram performed on 10/5/2022 there have been no significant interval changes.

## 2025-02-13 NOTE — ASSESSMENT
[FreeTextEntry1] : Mr. Cui is a 80 yr old M w/ h/o ICM/HFrEF (EF 35%, LVIDd 5.3cm) s/p CRT-D (8/23/24) for post-op CHB and PMT/VT arrest, CAD s/p 3vCABG 8/16/24 (LIMA-LAD, SVG-OM, SVG-RCA), AF (on AC) s/p Afib CV with Dr. Sarkar on 2/5/25,, mechanical AVR (2004 with Dr. Lester on Coumadin), DM2 (A1C 7.7%), CKD (baseline 1.8-2) here for f/u.  Currently ACC/AHA Stage II-lll symptoms, euvolemic and hypotensive.   #HFrEF -Etiology: ICM -GDMT: currently on Toprol XL 50mg BID, Entresto 24/26mg BID 0.5 tab BID, (prev on Entresto 24/26mg BID; d/c'ed due to low BP) - c/w Jardiance 10 mg  - c/w spironolactone 25 mg daily; counseled on low potassium foods, has Veltassa for Hyperkalemia in past but has not needed it.   -Diuretics: on Furosemide 20mg prn (was taking M/F); goal weight 220 pounds -Device: CRT-D -Recommended for cardiac rehab, however patient would like to think about it   #CAD -3vCABG (LIMA-LAD, SVG-OM, SVG-RCA) -on ASA, statin, and GDMT as above -f/u with Dr. Mcguire  #Aortic Valve Repair -mechanical AVR (2004 with Dr. Lester) -on coumadin  #CKD - b/l Cr 1.5-2 -f/u with Dr. Harman  # DM2 - on tradjenta 5mg QD and Jardiance - A1C 7.7% -> 6.1 - f/u with Dr. Bailey  RTC at at scheduled May 13 appt at 11:20 am with Dr. Vides

## 2025-02-13 NOTE — HISTORY OF PRESENT ILLNESS
[FreeTextEntry1] : Mr. Cui is a 80 yr old M w/ h/o ICM/HFrEF (EF 35%, LVIDd 5.3cm) s/p CRT-D (8/23/24) for post-op CHB and PMT/VT arrest, CAD s/p 3vCABG 8/16/24 (LIMA-LAD, SVG-OM, SVG-RCA), AF (on AC) s/p Afib CV with Dr. Sarkar on 2/5/25, mechanical AVR (2004 with Dr. Lester on Coumadin), DM2 (A1C 7.7%), CKD (baseline 1.8-2) here for f/u with brother Bill.   He presented for elective LHC on 8/13/24 following abnormal outpatient NST. Found to have severe multivessel disease, TTE with newly reduced LVEF of 26%. On 8/16, underwent 3v CABG (LIMA - LAD, SVG - OM, SVG - RCA) with Dr. Arzate. Hospital course was complicated by VF arrest on 8/18 in the setting of underlying bigeminy and possible under-sensing PPM resulting in pacer induced R-on-T, ROSC achieved after 2.5 minutes of CPR and 1 shock. On 8/23 had upgrade of device to CRT-D. He went to  rehab on 8/26/24 to 9/11/24 and was d/c with Toprol XL 25mg QD and lasix 20mg QD. Discharge weight from hospital was 234.3lb.   Today 2/13/25 has been feeling well. He was hospitalized 2/5/25 after remote transmission revealed that patient went into persistent AF on 1/14/25, VHR primarily between 70-110bpm. He had an Afib CV with Dr. Sarkar on 2/5/25.   His activity is limited by leg weakness.  Able to walk around house, shower and dress without SOB or fatigue.  Denies orthopnea or PND.  Sleeps on 5 pillows for anxiety over feeling SOB.  He had SOB in past before AVR.   He is adherent to low sodium diet.  Appetite is good and having daily BM.  Lives alone but brother checks in on him every day and sets up pill box weekly.    Had noted BP in  and in office is 89/52.  Denies LH/Dizziness.  Weight stable at 222 pounds at home and 220lbs in office.  Takes Lasix PRN and has not needed it lately.    Cardiac rehab was discussed in the past and recommended but he would like to defer.   Denies CP, orthopnea, bendopnea, PND, LH/dizziness, abd swelling, or LE edema, no ICD shocks.

## 2025-02-13 NOTE — CARDIOLOGY SUMMARY
[de-identified] : 2/13/25 Sinus Rhythm, 63bpm, BIV Paced 11/8/24 - possibly sinus; BiV pacing [de-identified] :  NST 7/25/24: Myocardial Perfusion: Abnormal. Perfusion Findings: Severe, fixed defects in the basal to mid inferior wall is consistent with a medium sized area of infarction. The distal inferior wall and apex has reversible defects consistent with ischemia.Hypokinesis of the basal to mid inferior wall is seen. The post stress left ventricular EF is 44 %. The stress end diastolic volume is 103 ml and systolic volume is 57 ml. [de-identified] :  -TTE 8/18: LVIDd 5.3cm, EF 35%, enlarged RV with reduced function. Nml functioning mechanical AV - TTE 8/13: 8/13:Left ventricular cavity is normal in size. Left ventricular wall thickness is normal. Left ventricular systolic function is severely decreased with an ejection fraction of 26 % by Noble's method of disks. Global left ventricular hypokinesis. There is severe (grade 3) left ventricular diastolic dysfunction, with elevated left ventricular filling pressure. The right ventricle is not well visualized. Borderline reduced right ventricular systolic function. No LV thrombus. Nml LA/RA volume. RA pressures 0-5. No paravalvular AR or AS.   -TTE 1/3/24: A mechanical valve replacement is present in the aortic position. Trace intravalvular regurgitation. Septal motion is abnormal consistent with previous cardiac surgery. Left ventricular systolic function is mildly decreased with an ejection fraction of 50 % by Noble's method of disks. Compared to the transthoracic echocardiogram performed on 10/5/2022 there have been no significant interval changes.  [de-identified] : - [facility]: 8/13/24: Multivessel dx. pLAD 60%, mLAD 70%, D1 40%. oLcx 40%, OM1 99%. pRCA 99%, mRCA 85%, oRPDA 60%

## 2025-02-13 NOTE — PHYSICAL EXAM
[Normal] : normal conjunctiva [Normal S1, S2] : normal S1, S2 [Clear Lung Fields] : clear lung fields [Soft] : abdomen soft [Non Tender] : non-tender [Normal Gait] : normal gait [No Edema] : no edema [No Rash] : no rash [Moves all extremities] : moves all extremities [Alert and Oriented] : alert and oriented [de-identified] : JVP 6-8 cm H20 [Normal Venous Pressure] : normal venous pressure [No Murmur] : no murmur [de-identified] : mechanical click audible

## 2025-02-13 NOTE — PHYSICAL EXAM
[Normal] : normal conjunctiva [Normal S1, S2] : normal S1, S2 [Clear Lung Fields] : clear lung fields [Soft] : abdomen soft [Non Tender] : non-tender [Normal Gait] : normal gait [No Edema] : no edema [No Rash] : no rash [Moves all extremities] : moves all extremities [Alert and Oriented] : alert and oriented [de-identified] : JVP 6-8 cm H20 [Normal Venous Pressure] : normal venous pressure [No Murmur] : no murmur [de-identified] : mechanical click audible

## 2025-02-13 NOTE — PHYSICAL EXAM
[Normal] : normal conjunctiva [Normal S1, S2] : normal S1, S2 [Clear Lung Fields] : clear lung fields [Soft] : abdomen soft [Non Tender] : non-tender [Normal Gait] : normal gait [No Edema] : no edema [No Rash] : no rash [Moves all extremities] : moves all extremities [Alert and Oriented] : alert and oriented [de-identified] : JVP 6-8 cm H20 [Normal Venous Pressure] : normal venous pressure [No Murmur] : no murmur [de-identified] : mechanical click audible

## 2025-03-07 NOTE — HISTORY OF PRESENT ILLNESS
[de-identified] : Mr. LEVI comes in for follow-up of diabetes mellitus.  His  FS at home in range of 110-120. There have been  no hypoglycemic reactions.  He  denies foot lesions and dysthesias. There has been no chest pain, shortness of breath or edema.  Denies visual problems,  nocturia and  weight change.  He states adherence to medication regimen.

## 2025-03-07 NOTE — ASSESSMENT
[FreeTextEntry1] : Well controlled DM Limit carbohydrates in diet Exercise 5-6 days per week Advocated dilated retinal exam yearly Proper foot care reviewed

## 2025-03-07 NOTE — HISTORY OF PRESENT ILLNESS
[de-identified] : Mr. LEVI comes in for follow-up of diabetes mellitus.  His  FS at home in range of 110-120. There have been  no hypoglycemic reactions.  He  denies foot lesions and dysthesias. There has been no chest pain, shortness of breath or edema.  Denies visual problems,  nocturia and  weight change.  He states adherence to medication regimen.

## 2025-06-13 NOTE — CARDIOLOGY SUMMARY
[de-identified] : 7/25/24: Myocardial Perfusion: Abnormal. Perfusion Findings: Severe, fixed defects in the basal to mid inferior wall is consistent with a medium sized area of infarction. The distal inferior wall and apex has reversible defects consistent with ischemia.Hypokinesis of the basal to mid inferior wall is seen. The post stress left ventricular EF is 44 %. The stress end diastolic volume is 103 ml and systolic volume is 57 ml. [de-identified] : 6/13/25 - sinus, BiV paced 2/13/25 Sinus Rhythm, 63bpm, BIV Paced 11/8/24 - possibly sinus; BiV pacing [de-identified] :  -TTE 8/18/24: LVIDd 5.3cm, EF 35%, enlarged RV with reduced function. Nml functioning mechanical AV - TTE 8/13: Left ventricular cavity is normal in size. Left ventricular wall thickness is normal. Left ventricular systolic function is severely decreased with an ejection fraction of 26 % by Noble's method of disks. Global left ventricular hypokinesis. There is severe (grade 3) left ventricular diastolic dysfunction, with elevated left ventricular filling pressure. The right ventricle is not well visualized. Borderline reduced right ventricular systolic function. No LV thrombus. Nml LA/RA volume. RA pressures 0-5. No paravalvular AR or AS.   -TTE 1/3/24: A mechanical valve replacement is present in the aortic position. Trace intravalvular regurgitation. Septal motion is abnormal consistent with previous cardiac surgery. Left ventricular systolic function is mildly decreased with an ejection fraction of 50 % by Noble's method of disks. Compared to the transthoracic echocardiogram performed on 10/5/2022 there have been no significant interval changes.  [de-identified] : 8/13/24: Multivessel dx. pLAD 60%, mLAD 70%, D1 40%. oLcx 40%, OM1 99%. pRCA 99%, mRCA 85%, oRPDA 60%

## 2025-06-13 NOTE — PHYSICAL EXAM
HISTORY OF PRESENT ILLNESS  Mattie De Los Santos is a 48 y.o. male. 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 1. Diabetes. Finger stick A1c reveals a worsened A1c. Sugars are out of control with an A1c at 11.2. Previously he was 10.0. He is taking his medication but admits to significant dietary indiscretions. I strongly encouraged work on diet, and we will make some medication adjustments. Return in three months for full labs. 2. Hyperlipidemia. Up to date with lab assessment. 3. Hypertension. Fair readings today, better on a recheck. Review of Systems: Notable for occasional headaches but no neurologic or cardiac symptoms. Review of Systems   Constitutional: Negative for weight loss. Respiratory: Negative. Cardiovascular: Negative for chest pain, palpitations, leg swelling and PND. Musculoskeletal: Positive for joint pain. Negative for myalgias. Neurological: Positive for headaches. Negative for focal weakness. Physical Exam   Constitutional: No distress. Neck: Carotid bruit is not present. Cardiovascular: Normal rate and regular rhythm. Exam reveals no gallop and no friction rub. No murmur heard. Pulmonary/Chest: Effort normal and breath sounds normal. No respiratory distress. Musculoskeletal: He exhibits no edema. Nursing note and vitals reviewed. 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 Diagnoses and all orders for this visit:    Uncontrolled type 2 diabetes mellitus without complication, without long-term current use of insulin (HCC)  -     MICROALBUMIN, UR, RAND W/ MICROALBUMIN/CREA RATIO  -     METABOLIC PANEL, COMPREHENSIVE  -     pioglitazone (ACTOS) 30 mg tablet;  Take 1 Tab by mouth daily.  -     AMB POC HEMOGLOBIN A1C    Need for hepatitis C screening test  -     HEPATITIS C AB    Diabetic eye exam ([facility] [address]  -     REFERRAL TO OPHTHALMOLOGY    Controlled type 2 diabetes mellitus without complication, unspecified long term insulin use status (HCC)  -     AMB POC HEMOGLOBIN A1C    Mixed hyperlipidemia  -     LIPID PANEL  -     CBC WITH AUTOMATED DIFF    Other orders  -     Cancel: HEMOGLOBIN A1C WITH EAG [Normal] : normal conjunctiva [Normal Venous Pressure] : normal venous pressure [Normal S1, S2] : normal S1, S2 [No Murmur] : no murmur [Clear Lung Fields] : clear lung fields [Soft] : abdomen soft [Non Tender] : non-tender [Normal Gait] : normal gait [No Edema] : no edema [No Rash] : no rash [Moves all extremities] : moves all extremities [Alert and Oriented] : alert and oriented [de-identified] : MONIE low [de-identified] : mechanical click audible

## 2025-06-13 NOTE — ASSESSMENT
[FreeTextEntry1] : Mr. Cui is a 80 yr old M w/ h/o ICM/HFrEF (EF 35%, LVIDd 5.3cm) s/p CRT-D (8/23/24) for post-op CHB and PMT/VT arrest, CAD s/p 3vCABG 8/16/24 (LIMA-LAD, SVG-OM, SVG-RCA), AF s/p DCCV 2/5/25 (on AC), mechanical AVR (2004 with Dr. Lester on Coumadin), DM2 (A1C 7.7%), CKD (baseline 1.8-2) here for f/u Currently ACC/AHA Stage II-lll symptoms, euvolemic and hypotensive.   #HFrEF -Etiology: ICM - c/w Toprol XL 50mg BID - on Entresto 24/26mg BID 0.5 tab at night but has hypotension so will stop and transition to losartan 12.5 mg daily  - c/w Jardiance 10 mg  - c/w spironolactone 25 mg daily; counseled on low potassium foods, has Veltassa for Hyperkalemia in past but has not needed it.   -Diuretics: on Furosemide 20mg prn (was taking M/F); goal weight 222-224 pounds -Device: CRT-D -Recommended for cardiac rehab, however patient would like to think about it - repeat TTE   #CAD -3vCABG (LIMA-LAD, SVG-OM, SVG-RCA) -on ASA, statin, and GDMT as above -f/u with Dr. Mcguire  #Aortic Valve Repair -mechanical AVR (2004 with Dr. Lester) -on coumadin  #CKD - b/l Cr 1.5-2 -f/u with Dr. Harman  # DM2 - on tradjenta 5mg QD and Jardiance - A1C 7.7% -> 6.1 - f/u with Dr. Bailey  # Cardiac clearance - currently optimized to undergo R cataract surgery without further cardiovascular testing - hold Jardiance for 3 days prior to surgery - hold losartan day of surgery - would continue coumadin given mechanical AVR but can hold ASA for 5 days  RTC 3 months with PA

## 2025-06-13 NOTE — HISTORY OF PRESENT ILLNESS
[FreeTextEntry1] : Mr. Cui is a 80 yr old M w/ h/o ICM/HFrEF (EF 35%, LVIDd 5.3cm) s/p CRT-D (8/23/24) for post-op CHB and PMT/VT arrest, CAD s/p 3vCABG 8/16/24 (LIMA-LAD, SVG-OM, SVG-RCA), AF s/p DCCV 2/5/25 (on AC), mechanical AVR (2004 with Dr. Lester on Coumadin), DM2 (A1C 7.7%), CKD (baseline 1.8-2) here for f/u with brother Bill.   For full initial details, please refer to note from 11/8/24.   Last visit, had been well. Hasn't been hospitalized since February when had persistent afib and was cardioverted. His activity is limited by dyspnea after 2 blocks.  Able to walk around house, shower and dress without SOB or fatigue.  Denies orthopnea or PND. Sleeps on 5 pillows for anxiety over feeling SOB. He had SOB in past before AVR.   He is adherent to low sodium diet. Appetite is good and having daily BM.   Lives alone but brother checks in on him every day and sets up pill box weekly.    Had noted BP in . Denies LH/Dizziness. Has only been taking Entresto 12/13 at night.   Weight stable at 222-224 pounds at home and 220lbs in office. Hasn't been taking lasix. Hydrates well.   Cardiac rehab was discussed in the past and recommended but he would like to defer.   Denies CP, orthopnea, bendopnea, PND, LH/dizziness, abd swelling, or LE edema, no ICD shocks.  Plans to undergo R eye cataract surgery in July.

## 2025-06-23 NOTE — ASSESSMENT
[FreeTextEntry1] : Arvind is a 80 years old man with a history of CKD III here for follow up.  Chronic Kidney Disease Stage III -- Stage of CKD is based upon eGFR(cr), eGFR (cys), and 24 urine collection which seem to place his GFR at about 45 or so and has not changed much even in light of recent events for CABG. 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  Renal sonogram showing some areas of cortical thinning.  Cr in 2014 was 1.2, in 2016 about 1.4 and now steady at about 1.7 to 1.8 more recently 2.1  in January.  I explained to him the nature of the heart kidney connection. His rise in creatinine in the setting of GDMT for CHF is acceptable. Entresto being held presently for hypotension and is now on losartan instead.  Will need to monitor cr and k.  Blood Pressure -- BP is still low will recommend to decrease spironlactone to 12.5 mg po daily and monitor BP.;  Hyperkalemia -- last K was 4.7.  Has veltassa at home in case he needs it.  The time spent is inclusive of the time it took to see, evaluate, and manage the patient.  Time is inclusive of the time taken to review chart, reconcile medications, document findings, and communicate with other providers (when applicable).

## 2025-06-23 NOTE — PHYSICAL EXAM
[General Appearance - Alert] : alert [General Appearance - In No Acute Distress] : in no acute distress [General Appearance - Well Nourished] : well nourished [General Appearance - Well Developed] : well developed [Sclera] : the sclera and conjunctiva were normal [Jugular Venous Distention Increased] : there was no jugular-venous distention [] : no respiratory distress [Respiration, Rhythm And Depth] : normal respiratory rhythm and effort [Exaggerated Use Of Accessory Muscles For Inspiration] : no accessory muscle use [Auscultation Breath Sounds / Voice Sounds] : lungs were clear to auscultation bilaterally [Heart Sounds Gallop] : no gallops [Murmurs] : no murmurs [Heart Sounds Pericardial Friction Rub] : no pericardial rub [FreeTextEntry1] : mechanical heart sound noted [Edema] : there was no peripheral edema [No CVA Tenderness] : no ~M costovertebral angle tenderness [No Spinal Tenderness] : no spinal tenderness [Oriented To Time, Place, And Person] : oriented to person, place, and time [Impaired Insight] : insight and judgment were intact [Affect] : the affect was normal [Mood] : the mood was normal

## 2025-06-23 NOTE — HISTORY OF PRESENT ILLNESS
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  He has been retired for the last 13 years or so and spends most of his time [place] around [facility].  His medical history is mostly unremarkable with the exception of an aortic valve replacement in 2004 for which he takes coumadin.  He developed SVT during a stress test and so he is on metoprolol recently.  On my evaluation today the patient is asymptomatic.  He wakes up once per night to urinate but denies hesitancy and endorses a healthy stream.  He has no hematuria, dysuria.  He has no dyspnea, nausea, vomiting, diarrhea, chest pain, fevers, nor rashes.  He has no family history of kidney problems.    7/2/18 Since our last visit Arvind has felt great and denies all complaints.  He saw urology and his cyst is not concerning.    10/29/18 Since our last visit Arvind continues to feel well.  He has no complaints no dyspnea no urinary issues no nausea vomiting or diarrhea he has stopped all of his supplements about two weeks ago. Overall he feels quite well.  4/29/19 -- Since I saw Arvind last he continues to feel well.  He has no symptoms.  no cp no sob no nvd.  12/2/19 -- Since I last saw Arvind he has no complaints.  He recently had an episode of nephrolithiasis requiring ureteral stent which has since been removed.  He drinks plenty of water and has a low salt diet.  On evaluation today he has no chest pain no shortness of breath.  9/18/20 -- Since I last saw Arvind he has had some bilateral lower back pain that he is using capsaicin for.  Still wakes up at night two or three times to urinate.  Otherwise feels well.  4/5/21 -- Arvind feels well no complaints.  Has been staying in isolation during COVID  10/19/21 -- Arvind feels well and denies complaints.  He has no chest pain no shortness of breath.  Still has not had his COVID vaccine.  4/4/22 -- Feels well denies complaints.  Has had his covid vaccine.    10/20/22 -- Feels well denies complaints recent dental surgery and cataract surgey.  No new medications.  no complaints.  5/8/23 -- Feels well denies complaints no changes to medications recently.    6/17/24 -- Feels completely fine.  no symptoms but was recently diagnosed with diabetes and has been taking insulin.  12/16/24 -- Feels well today.  Since our last visit he had CABG for triple vessel disease.  course was complicated by cardiac arrest heart failure.  Doing better now was in rehab.  entresto was held due to hypotension.    6/23/25 --  Patient seen and evaluated today feels fine presently but had an episode of dizziness 3 days ago where he could not get up.  Review of his blood pressure at home shows multiple readings below 90 systolic.

## 2025-06-23 NOTE — REVIEW OF SYSTEMS
[Fever] : no fever [Chills] : no chills [Feeling Poorly] : not feeling poorly [Feeling Tired] : not feeling tired [Eyesight Problems] : no eyesight problems [Nosebleeds] : no nosebleeds [Chest Pain] : no chest pain [Leg Claudication] : no intermittent leg claudication [Lower Ext Edema] : no extremity edema [Shortness Of Breath] : no shortness of breath [Wheezing] : no wheezing [As Noted in HPI] : as noted in HPI [Dizziness] : dizziness [Fainting] : no fainting

## 2025-06-27 NOTE — PHYSICAL EXAM
[Normal] : normal rate, regular rhythm, normal S1 and S2 and no murmur heard [Left Femoral Bruit] : left femoral bruit heard [1+] : right 1+ [0] : left 0 [Bruit] : no bruit heard

## 2025-06-27 NOTE — ASSESSMENT
[FreeTextEntry1] : Goal A1c LT 7%   7.5%   8% At goal   Not at goal Limit carbohydrates in diet Exercise 5-6 days per week Advocated dilated retinal exam yearly Proper foot care reviewed

## 2025-06-27 NOTE — HISTORY OF PRESENT ILLNESS
[de-identified] : Mr. LEVI comes in for follow-up of diabetes mellitus.  His  FS at home in range of . There have been  no hypoglycemic reactions.  There has been no chest pain, shortness of breath or edema.  Denies visual problems,  nocturia and  weight change.  He states adherence to medication regimen.  Describes tingling in left foot and claudication i nleft calf

## 2025-06-27 NOTE — HISTORY OF PRESENT ILLNESS
[de-identified] : Mr. LEVI comes in for follow-up of diabetes mellitus.  His  FS at home in range of . There have been  no hypoglycemic reactions.  There has been no chest pain, shortness of breath or edema.  Denies visual problems,  nocturia and  weight change.  He states adherence to medication regimen.  Describes tingling in left foot and claudication i nleft calf

## 2025-07-23 NOTE — PROCEDURE
[FreeTextEntry3] : Cerumen impaction removed with suction and curette from bilateral ear canals. After removal of cerumen canal noted to be normal without edema, purulence or inflammation. Patient tolerated procedure well.

## 2025-07-23 NOTE — ASSESSMENT
[FreeTextEntry1] : 81y/o male who came in for bilateral wax build and ear fullness  Ear exam: bilateral cerumen impaction  -Wax removed from bilateral ear canals  -Defers audio-feels his hearing is okay -F/u annually for ear cleaning

## 2025-07-23 NOTE — CONSULT LETTER
[Dear  ___] : Dear  [unfilled], [Consult Letter:] : I had the pleasure of evaluating your patient, [unfilled]. [Please see my note below.] : Please see my note below. [Consult Closing:] : Thank you very much for allowing me to participate in the care of this patient.  If you have any questions, please do not hesitate to contact me. [Sincerely,] : Sincerely, [FreeTextEntry3] : Merly Harris MD Otolaryngology and Cranial Base Surgery  Attending Physician- Department of Otolaryngology and Head & Neck Surgery  [facility] [facility] Office: [phone]  Fax: [phone]

## 2025-07-23 NOTE — HISTORY OF PRESENT ILLNESS
[de-identified] : 79y/o male who came in for bilateral wax build. His pcp recommend him to come in for bilateral cerumen impaction. His ears feel clogged. he has been using q-tips. Pt denies any ear pain, drainage, tinnitus or hearing loss

## 2025-07-23 NOTE — PHYSICAL EXAM
[Normal] : tympanic membranes are normal in both ears [de-identified] : Bilateral cerumen impaction

## 2025-07-23 NOTE — END OF VISIT
[FreeTextEntry3] : I personally saw and examined Mr. ROMY LEVI in detail this visit today. I personally reviewed the HPI, PMH, FH, SH, ROS and medications/allergies. I have spoken to ALEXA Villa regarding the history and have personally determined the assessment and plan of care, and documented this myself. I was present and participated in all key portions of the encounter and all procedures noted above. I have made changes in the body of the note where appropriate.   Attesting Faculty: See Attending Signature Below

## 2025-07-30 NOTE — HISTORY OF PRESENT ILLNESS
[de-identified] : RTC to follow up on PAD. LE dopplers did not reveal significant stenosis, but did have diffuse plaque and velocities suggestive of aorto-iliac disease.

## 2025-07-30 NOTE — HISTORY OF PRESENT ILLNESS
[de-identified] : RTC to follow up on PAD. LE dopplers did not reveal significant stenosis, but did have diffuse plaque and velocities suggestive of aorto-iliac disease.